# Patient Record
Sex: FEMALE | Race: AMERICAN INDIAN OR ALASKA NATIVE | NOT HISPANIC OR LATINO | Employment: UNEMPLOYED | URBAN - METROPOLITAN AREA
[De-identification: names, ages, dates, MRNs, and addresses within clinical notes are randomized per-mention and may not be internally consistent; named-entity substitution may affect disease eponyms.]

---

## 2018-04-06 ENCOUNTER — HOSPITAL ENCOUNTER (OUTPATIENT)
Dept: RADIOLOGY | Facility: MEDICAL CENTER | Age: 38
End: 2018-04-06
Attending: FAMILY MEDICINE
Payer: MEDICAID

## 2018-04-06 DIAGNOSIS — M25.531 RIGHT WRIST PAIN: ICD-10-CM

## 2018-04-06 PROCEDURE — 73221 MRI JOINT UPR EXTREM W/O DYE: CPT | Mod: RT

## 2018-07-09 PROCEDURE — 99285 EMERGENCY DEPT VISIT HI MDM: CPT

## 2018-07-09 PROCEDURE — 96374 THER/PROPH/DIAG INJ IV PUSH: CPT

## 2018-07-09 ASSESSMENT — PAIN SCALES - GENERAL: PAINLEVEL_OUTOF10: 6

## 2018-07-10 ENCOUNTER — HOSPITAL ENCOUNTER (EMERGENCY)
Facility: MEDICAL CENTER | Age: 38
End: 2018-07-10
Attending: EMERGENCY MEDICINE
Payer: MEDICAID

## 2018-07-10 ENCOUNTER — APPOINTMENT (OUTPATIENT)
Dept: RADIOLOGY | Facility: MEDICAL CENTER | Age: 38
End: 2018-07-10
Attending: EMERGENCY MEDICINE
Payer: MEDICAID

## 2018-07-10 VITALS
BODY MASS INDEX: 38.58 KG/M2 | HEART RATE: 91 BPM | TEMPERATURE: 98 F | HEIGHT: 62 IN | RESPIRATION RATE: 18 BRPM | WEIGHT: 209.66 LBS | DIASTOLIC BLOOD PRESSURE: 68 MMHG | SYSTOLIC BLOOD PRESSURE: 119 MMHG | OXYGEN SATURATION: 93 %

## 2018-07-10 DIAGNOSIS — R10.84 GENERALIZED ABDOMINAL PAIN: ICD-10-CM

## 2018-07-10 LAB
ALBUMIN SERPL BCP-MCNC: 4.1 G/DL (ref 3.2–4.9)
ALBUMIN/GLOB SERPL: 1.1 G/DL
ALP SERPL-CCNC: 92 U/L (ref 30–99)
ALT SERPL-CCNC: 46 U/L (ref 2–50)
ANION GAP SERPL CALC-SCNC: 12 MMOL/L (ref 0–11.9)
AST SERPL-CCNC: 73 U/L (ref 12–45)
BASOPHILS # BLD AUTO: 0.2 % (ref 0–1.8)
BASOPHILS # BLD: 0.04 K/UL (ref 0–0.12)
BILIRUB SERPL-MCNC: 0.9 MG/DL (ref 0.1–1.5)
BUN SERPL-MCNC: 14 MG/DL (ref 8–22)
CALCIUM SERPL-MCNC: 9.5 MG/DL (ref 8.5–10.5)
CHLORIDE SERPL-SCNC: 105 MMOL/L (ref 96–112)
CO2 SERPL-SCNC: 19 MMOL/L (ref 20–33)
CREAT SERPL-MCNC: 0.81 MG/DL (ref 0.5–1.4)
EOSINOPHIL # BLD AUTO: 0.03 K/UL (ref 0–0.51)
EOSINOPHIL NFR BLD: 0.2 % (ref 0–6.9)
ERYTHROCYTE [DISTWIDTH] IN BLOOD BY AUTOMATED COUNT: 43.7 FL (ref 35.9–50)
GLOBULIN SER CALC-MCNC: 3.7 G/DL (ref 1.9–3.5)
GLUCOSE SERPL-MCNC: 138 MG/DL (ref 65–99)
HCG SERPL QL: NEGATIVE
HCT VFR BLD AUTO: 42.3 % (ref 37–47)
HGB BLD-MCNC: 14.7 G/DL (ref 12–16)
IMM GRANULOCYTES # BLD AUTO: 0.06 K/UL (ref 0–0.11)
IMM GRANULOCYTES NFR BLD AUTO: 0.4 % (ref 0–0.9)
LIPASE SERPL-CCNC: 10 U/L (ref 11–82)
LYMPHOCYTES # BLD AUTO: 1.73 K/UL (ref 1–4.8)
LYMPHOCYTES NFR BLD: 10.2 % (ref 22–41)
MCH RBC QN AUTO: 33.2 PG (ref 27–33)
MCHC RBC AUTO-ENTMCNC: 34.8 G/DL (ref 33.6–35)
MCV RBC AUTO: 95.5 FL (ref 81.4–97.8)
MONOCYTES # BLD AUTO: 0.83 K/UL (ref 0–0.85)
MONOCYTES NFR BLD AUTO: 4.9 % (ref 0–13.4)
NEUTROPHILS # BLD AUTO: 14.2 K/UL (ref 2–7.15)
NEUTROPHILS NFR BLD: 84.1 % (ref 44–72)
NRBC # BLD AUTO: 0 K/UL
NRBC BLD-RTO: 0 /100 WBC
PLATELET # BLD AUTO: 263 K/UL (ref 164–446)
PMV BLD AUTO: 9.9 FL (ref 9–12.9)
POTASSIUM SERPL-SCNC: 3.9 MMOL/L (ref 3.6–5.5)
PROT SERPL-MCNC: 7.8 G/DL (ref 6–8.2)
RBC # BLD AUTO: 4.43 M/UL (ref 4.2–5.4)
SODIUM SERPL-SCNC: 136 MMOL/L (ref 135–145)
WBC # BLD AUTO: 16.9 K/UL (ref 4.8–10.8)

## 2018-07-10 PROCEDURE — 76830 TRANSVAGINAL US NON-OB: CPT

## 2018-07-10 PROCEDURE — 80053 COMPREHEN METABOLIC PANEL: CPT

## 2018-07-10 PROCEDURE — 84703 CHORIONIC GONADOTROPIN ASSAY: CPT

## 2018-07-10 PROCEDURE — 85025 COMPLETE CBC W/AUTO DIFF WBC: CPT

## 2018-07-10 PROCEDURE — 700111 HCHG RX REV CODE 636 W/ 250 OVERRIDE (IP): Performed by: EMERGENCY MEDICINE

## 2018-07-10 PROCEDURE — 83690 ASSAY OF LIPASE: CPT

## 2018-07-10 RX ORDER — MORPHINE SULFATE 4 MG/ML
4 INJECTION, SOLUTION INTRAMUSCULAR; INTRAVENOUS ONCE
Status: COMPLETED | OUTPATIENT
Start: 2018-07-10 | End: 2018-07-10

## 2018-07-10 RX ORDER — PROPRANOLOL HYDROCHLORIDE 40 MG/1
40 TABLET ORAL 3 TIMES DAILY
COMMUNITY
End: 2019-11-12

## 2018-07-10 RX ADMIN — MORPHINE SULFATE 4 MG: 4 INJECTION INTRAVENOUS at 01:42

## 2018-07-10 NOTE — ED NOTES
Pt comes up to the desk stating that their pain is getting worse. Pt states that the pain is now at 8/10. Triage RN notified.

## 2018-07-10 NOTE — ED TRIAGE NOTES
"Chief Complaint   Patient presents with   • Abdominal Pain     Pt transfer from [facility].  Pt states they found ovarian cyst on left ovary, and were unable to find right ovary     Pt ambulatory to triage with above complaint.  Pt states she was transferred here because previous facility \"wanted her to be looked at by an OB-GYN to see if they could find her ovaries or determine if a cyst had ruptured.\"  Pt states she was given antibiotics and pain medication at previous facility.      Pt returned to [facility], educated on triage process, and to inform staff of any changes or concerns.    Blood pressure 119/68, pulse (!) 106, temperature 36.7 °C (98 °F), resp. rate 18, height 1.575 m (5' 2\"), weight 95.1 kg (209 lb 10.5 oz), SpO2 94 %.      "

## 2018-07-10 NOTE — DISCHARGE INSTRUCTIONS
You were seen in the ER for abdominal pain. An ultrasound was able to see your right ovary and there was noted to be fluid in your pelvis to suggest a ruptured hemorrhagic cyst. I have spoken with our on-call gynecologist who feels that you are safe for discharge. Tylenol and ibuprofen for pain control. Follow up with her primary care physician within the next 24-48 hours. Return to the ER with new or worsening symptoms.    Abdominal Pain (Nonspecific)  Your exam might not show the exact reason you have abdominal pain. Since there are many different causes of abdominal pain, another checkup and more tests may be needed. It is very important to follow up for lasting (persistent) or worsening symptoms. A possible cause of abdominal pain in any person who still has his or her appendix is acute appendicitis. Appendicitis is often hard to diagnose. Normal blood tests, urine tests, ultrasound, and CT scans do not completely rule out early appendicitis or other causes of abdominal pain. Sometimes, only the changes that happen over time will allow appendicitis and other causes of abdominal pain to be determined. Other potential problems that may require surgery may also take time to become more apparent. Because of this, it is important that you follow all of the instructions below.  HOME CARE INSTRUCTIONS   · Rest as much as possible.   · Do not eat solid food until your pain is gone.   · While adults or children have pain: A diet of water, weak decaffeinated tea, broth or bouillon, gelatin, oral rehydration solutions (ORS), frozen ice pops, or ice chips may be helpful.   · When pain is gone in adults or children: Start a light diet (dry toast, crackers, applesauce, or white rice). Increase the diet slowly as long as it does not bother you. Eat no dairy products (including cheese and eggs) and no spicy, fatty, fried, or high-fiber foods.   · Use no alcohol, caffeine, or cigarettes.   · Take your regular medicines unless  your caregiver told you not to.   · Take any prescribed medicine as directed.   · Only take over-the-counter or prescription medicines for pain, discomfort, or fever as directed by your caregiver. Do not give aspirin to children.   If your caregiver has given you a follow-up appointment, it is very important to keep that appointment. Not keeping the appointment could result in a permanent injury and/or lasting (chronic) pain and/or disability. If there is any problem keeping the appointment, you must call to reschedule.   SEEK IMMEDIATE MEDICAL CARE IF:   · Your pain is not gone in 24 hours.   · Your pain becomes worse, changes location, or feels different.   · You or your child has an oral temperature above 102° F (38.9° C), not controlled by medicine.   · Your baby is older than 3 months with a rectal temperature of 102° F (38.9° C) or higher.   · Your baby is 3 months old or younger with a rectal temperature of 100.4° F (38° C) or higher.   · You have shaking chills.   · You keep throwing up (vomiting) or cannot drink liquids.   · There is blood in your vomit or you see blood in your bowel movements.   · Your bowel movements become dark or black.   · You have frequent bowel movements.   · Your bowel movements stop (become blocked) or you cannot pass gas.   · You have bloody, frequent, or painful urination.   · You have yellow discoloration in the skin or whites of the eyes.   · Your stomach becomes bloated or bigger.   · You have dizziness or fainting.   · You have chest or back pain.   MAKE SURE YOU:   · Understand these instructions.   · Will watch your condition.   · Will get help right away if you are not doing well or get worse.   Document Released: 12/18/2006 Document Revised: 03/11/2013 Document Reviewed: 11/15/2010  [employer]® Patient Information ©2013 [employer].

## 2018-07-10 NOTE — ED PROVIDER NOTES
ED Provider Note    CHIEF COMPLAINT  Chief Complaint   Patient presents with   • Abdominal Pain     Pt transfer from [facility].  Pt states they found ovarian cyst on left ovary, and were unable to find right ovary       HPI  Adry Sanchez is a 38 y.o. female who presents with a chief complaint of abdominal pain. Patient reports that she developed bilateral lower quadrant and suprapubic pain earlier today that is sharp and cramping in nature. The pain doubled her over and she took ibuprofen prior to evaluation in the [facility] emergency department. The patient reports that urinating made her pain significantly worse. No nausea or vomiting, hematuria, diarrhea, fever, chest pain, shortness of breath. Initially seen in the [facility] ED where labs and imaging (TVUS and CT abdomen/pelvis) were unremarkable. Transferred to [facility] ED for potential gynecology consult.    REVIEW OF SYSTEMS  See HPI for further details. Abdominal pain. All other systems are negative.     PAST MEDICAL HISTORY   has a past medical history of Arthritis; Depression; Fracture; Head ache; Heart murmur; Hypertension; and Migraines.    SOCIAL HISTORY  Social History     Social History Main Topics   • Smoking status: Current Some Day Smoker     Packs/day: 0.25     Years: 16.00     Types: Cigarettes   • Smokeless tobacco: Never Used      Comment: occasionally x 14 years   • Alcohol use Yes      Comment: occassional   • Drug use: No   • Sexual activity: Not on file       SURGICAL HISTORY   has a past surgical history that includes appendectomy.    CURRENT MEDICATIONS  Home Medications     Reviewed by Soledad Lewis R.N. (Registered Nurse) on 07/10/18 at 0036  Med List Status: Complete   Medication Last Dose Status   DULoxetine HCl (CYMBALTA PO) not taking Active   hydrocodone-acetaminophen (VICODIN) 5-500 MG TABS not taking Active   MedroxyPROGESTERone Acetate (DEPO-PROVERA IM) not taking Active   methocarbamol (ROBAXIN) 750  "MG TABS 7/10/2018 Active   Non Formulary Request not taking Active   oxycodone-acetaminophen (PERCOCET) 5-325 MG TABS not  taking Active   oxycodone-acetaminophen (PERCOCET) 5-325 MG TABS not taking Active   oxycodone-acetaminophen (PERCOCET) 5-325 MG TABS not taking Active   propranolol (INDERAL) 40 MG Tab 7/10/2018 Active   sumatriptan (IMITREX) 50 MG Tab not taking Active   trazodone (DESYREL) 50 MG Tab 7/9/2018 Active                ALLERGIES  No Known Allergies    PHYSICAL EXAM  VITAL SIGNS: /68   Pulse 90   Temp 36.7 °C (98 °F)   Resp 18   Ht 1.575 m (5' 2\")   Wt 95.1 kg (209 lb 10.5 oz)   SpO2 96%   BMI 38.35 kg/m²    Pulse ox interpretation: I interpret this pulse ox as normal.  Constitutional: Alert in no apparent distress.  HENT: No signs of trauma, Bilateral external ears normal, Nose normal.   Eyes: Pupils are equal and reactive, Conjunctiva normal, Non-icteric.   Neck: Normal range of motion, No tenderness, Supple, No stridor.   Lymphatic: No lymphadenopathy noted.   Cardiovascular: Regular rate and rhythm, no murmurs.   Thorax & Lungs: Normal breath sounds, No respiratory distress, No wheezing, No chest tenderness.   Abdomen: Bowel sounds normal, Soft, diffuse abdominal tenderness to palpation, No masses, No pulsatile masses. No peritoneal signs.  : Deferred given patient's exam earlier tonight  Skin: Warm, Dry, No erythema, No rash.   Back: No bony tenderness, No CVA tenderness.   Extremities: Intact distal pulses, No edema, No tenderness, No cyanosis.  Musculoskeletal: Good range of motion in all major joints. No tenderness to palpation or major deformities noted.   Neurologic: Alert , Normal motor function, Normal sensory function, No focal deficits noted.   Psychiatric: Affect normal, Judgment normal, Mood normal.       DIAGNOSTIC STUDIES / PROCEDURES      LABS  CBC  CMP  Lipase  HCG    RADIOLOGY  Transvaginal ultrasound      COURSE & MEDICAL DECISION MAKING  Pertinent Labs & Imaging " studies reviewed. (See chart for details)  This is a 38-year-old female here with diffuse abdominal pain. Patient was seen at the [facility] emergency department earlier tonight and per chart review, she had an ultrasound performed that noted a 5.5 cm cystic structure on the left ovary but her right ovary was not visualized. A CT of the abdomen/pelvis also revealed a 5.5 cm cystic process in the left ovary likely a cyst. The appendix was not visualized but no obvious appendicitis was appreciated. Her IUD was noted on the CT scan to be in the uterus in good position. During that ER visit, her vital signs were normal beyond an elevated blood pressure. A pelvic exam was performed that revealed whitish yellow discharge in the vaginal vault with cervical motion tenderness and right adnexal tenderness. She did not have a leukocytosis and she had a mild transaminitis. A wet prep did not reveal trichomonads, clue cells, or yeast. Chlamydia and gonorrhea PCR were negative. A urine pregnancy test was negative as well. Urinalysis did not suggest infection. She was given a dose of azithromycin and ceftriaxone for presumed cervicitis/PID as well as morphine and oxycodone for pain control. She was transferred to this ER for potential gynecology evaluation given the ultrasound's inability to visualize her right ovary.    Patient arrives tachycardic but afebrile with otherwise normal vital signs. She appears well-hydrated and nontoxic. Her abdominal exam reveals tenderness to palpation diffusely now worse in the bilateral upper quadrants. Pelvic exam deferred given patient's recent exam performed at the outside facility. She does have a negative wet prep and GC/chlamydia. Plan for pain control and will repeat the transvaginal ultrasound with specific instructions to evaluate the right ovary. Will also redraw a CBC and CMP to trend LFTs and ensure she is not worsening from that standpoint suggesting possible  cholecystitis.    Labs reveal leukocytosis; worsening from previous lab draw. HCG remains negative. Transaminitis is resolving. Doubt cholecystitis, choledocolithiasis.    TVUS reveals moderate to large quantity of free fluid within the pelvis c/w hemorrhagic fluid. Right ovary visualized with good flow. Unlikely torsion. Large left ovarian cyst c/w hemorrhagic cyst.    Discussed with on-call gynecologist; Dr. Zimmerman who feels the patient is safe for discharge from a gynecologic standpoint. Recommendation for pain control.    Reevaluated the patient. Abdomen continues to be tender more on the right side now. Given the worsening leukocytosis, I recommended admission to the hospital for continued pain management, serial abdominal exams, repeat labs to ensure resolution of leukocytosis. Patient declines admission. Her vital signs are normal and stable. I do feel it is reasonable to let her go home with very strict return precautions and very close outpatient follow up. She agrees to this. She has a PCP and will call them for a follow up appointment within 24 hours. She and her  promise to return to the ER with any new or worsening symptoms. She will also need gyn follow up for her ovarian cysts. Tylenol and ibuprofen for pain control.    The patient will return for worsening symptoms and is stable at the time of discharge. The patient verbalizes understanding and will comply.    FINAL IMPRESSION  1. Abdominal pain  2. Left ovarian cyst      Electronically signed by: Jose Alejandro Dickerson, 7/10/2018 1:11 AM

## 2019-11-12 DIAGNOSIS — Z01.810 PRE-OPERATIVE CARDIOVASCULAR EXAMINATION: ICD-10-CM

## 2019-11-12 DIAGNOSIS — Z01.812 PRE-OPERATIVE LABORATORY EXAMINATION: ICD-10-CM

## 2019-11-12 LAB
ANION GAP SERPL CALC-SCNC: 10 MMOL/L (ref 0–11.9)
APPEARANCE UR: CLEAR
BASOPHILS # BLD AUTO: 0.7 % (ref 0–1.8)
BASOPHILS # BLD: 0.06 K/UL (ref 0–0.12)
BILIRUB UR QL STRIP.AUTO: NEGATIVE
BUN SERPL-MCNC: 8 MG/DL (ref 8–22)
CALCIUM SERPL-MCNC: 9.7 MG/DL (ref 8.5–10.5)
CHLORIDE SERPL-SCNC: 101 MMOL/L (ref 96–112)
CO2 SERPL-SCNC: 25 MMOL/L (ref 20–33)
COLOR UR: YELLOW
CREAT SERPL-MCNC: 0.75 MG/DL (ref 0.5–1.4)
EOSINOPHIL # BLD AUTO: 0.12 K/UL (ref 0–0.51)
EOSINOPHIL NFR BLD: 1.5 % (ref 0–6.9)
ERYTHROCYTE [DISTWIDTH] IN BLOOD BY AUTOMATED COUNT: 43 FL (ref 35.9–50)
GLUCOSE SERPL-MCNC: 87 MG/DL (ref 65–99)
GLUCOSE UR STRIP.AUTO-MCNC: NEGATIVE MG/DL
HCT VFR BLD AUTO: 45.5 % (ref 37–47)
HGB BLD-MCNC: 15.1 G/DL (ref 12–16)
IMM GRANULOCYTES # BLD AUTO: 0.05 K/UL (ref 0–0.11)
IMM GRANULOCYTES NFR BLD AUTO: 0.6 % (ref 0–0.9)
KETONES UR STRIP.AUTO-MCNC: NEGATIVE MG/DL
LEUKOCYTE ESTERASE UR QL STRIP.AUTO: NEGATIVE
LYMPHOCYTES # BLD AUTO: 2.28 K/UL (ref 1–4.8)
LYMPHOCYTES NFR BLD: 27.8 % (ref 22–41)
MCH RBC QN AUTO: 32.6 PG (ref 27–33)
MCHC RBC AUTO-ENTMCNC: 33.2 G/DL (ref 33.6–35)
MCV RBC AUTO: 98.3 FL (ref 81.4–97.8)
MICRO URNS: NORMAL
MONOCYTES # BLD AUTO: 0.64 K/UL (ref 0–0.85)
MONOCYTES NFR BLD AUTO: 7.8 % (ref 0–13.4)
NEUTROPHILS # BLD AUTO: 5.04 K/UL (ref 2–7.15)
NEUTROPHILS NFR BLD: 61.6 % (ref 44–72)
NITRITE UR QL STRIP.AUTO: NEGATIVE
NRBC # BLD AUTO: 0 K/UL
NRBC BLD-RTO: 0 /100 WBC
PH UR STRIP.AUTO: 7 [PH] (ref 5–8)
PLATELET # BLD AUTO: 349 K/UL (ref 164–446)
PMV BLD AUTO: 9.4 FL (ref 9–12.9)
POTASSIUM SERPL-SCNC: 4.5 MMOL/L (ref 3.6–5.5)
PROT UR QL STRIP: NEGATIVE MG/DL
RBC # BLD AUTO: 4.63 M/UL (ref 4.2–5.4)
RBC UR QL AUTO: NEGATIVE
SODIUM SERPL-SCNC: 136 MMOL/L (ref 135–145)
SP GR UR STRIP.AUTO: 1.01
UROBILINOGEN UR STRIP.AUTO-MCNC: 0.2 MG/DL
WBC # BLD AUTO: 8.2 K/UL (ref 4.8–10.8)

## 2019-11-12 PROCEDURE — 93005 ELECTROCARDIOGRAM TRACING: CPT

## 2019-11-12 PROCEDURE — 93010 ELECTROCARDIOGRAM REPORT: CPT | Performed by: INTERNAL MEDICINE

## 2019-11-12 PROCEDURE — 81003 URINALYSIS AUTO W/O SCOPE: CPT

## 2019-11-12 PROCEDURE — 80048 BASIC METABOLIC PNL TOTAL CA: CPT

## 2019-11-12 PROCEDURE — 36415 COLL VENOUS BLD VENIPUNCTURE: CPT

## 2019-11-12 PROCEDURE — 85025 COMPLETE CBC W/AUTO DIFF WBC: CPT

## 2019-11-12 RX ORDER — PROPRANOLOL HYDROCHLORIDE 20 MG/1
20 TABLET ORAL 2 TIMES DAILY
COMMUNITY

## 2019-11-13 LAB — EKG IMPRESSION: NORMAL

## 2019-11-20 NOTE — H&P
DATE OF SCHEDULED SURGERY:  2019    HISTORY OF PRESENT ILLNESS:  The patient is a 39-year-old  6, para   4-0-2-4, with 4 normal vaginal deliveries, desires sterilization, also has a   finding of left ovarian cyst during evaluation for pelvic pain and wants to   get the cyst out and possible the ovary if it is abnormal.  The patient is   here for preop appointment.  The patient currently has a Mirena IUD for   contraception and has not bled for 8 years, but desires sterilization and is   going to remove the IUD and also take care of her left ovarian cyst.    MEDICAL HISTORY:  The patient denies high blood pressure, diabetes and thyroid   issues, is not on any medication currently.    OBSTETRIC HISTORY:   7, para 4, 4 normal vaginal deliveries.    SOCIAL HISTORY:  Current someday smoker, occasional use of alcohol.    SURGICAL HISTORY:  Had an appendectomy before.    FAMILY HISTORY:  Nothing contributory.    ALLERGIES:  The patient denies any known drug allergy.    REVIEW OF SYSTEMS:  The patient is alert and oriented.  Regular bowel and   bladder habits.  Denies chest pain, palpitation, or shortness of breath.    PHYSICAL EXAMINATION:  VITAL SIGNS:  The patient is 5 feet 2 inches tall and weighs 200 pounds.    Vital signs are stable.  See EMR for current vitals.  GENERAL:  The patient is awake, alert, well developed, well nourished and well   groomed.  RESPIRATION:  The patient is relaxed and breathes without effort.  LUNGS:  Clear to auscultate bilaterally.  No crackles, wheezes, or rhonchi.  HEART:  Normal, rhythm is regular.  S1, S2 are normal.  No murmurs.  No   gallops or rubs.  ABDOMEN:  Soft, nontender.  No guarding or rigidity.  No palpable masses, no   hepatosplenomegaly, no costovertebral angle tenderness.  PELVIC:  Deferred.    IMPRESSION:  A 39-year-old  6, para 4-0-2-4, with 4 normal vaginal   deliveries with a Mirena intrauterine device in place, desires removal of    intrauterine device and sterilization and opted for bilateral salpingectomy.    Scheduled for laparoscopic bilateral salpingectomy and ovarian cystectomy,   possible unilateral salpingo-oophorectomy, possible laparotomy, and   intrauterine device removal.  Preoperative instructions were given.  Operative   procedure was discussed in detail.  Postoperative recovery was explained and   all questions answered to satisfaction.  Risk inclusive of but not limited to   anesthesia, infection, injury was explained.  The patient understands and   wants to go ahead with the procedure.             ____________________________________     MD VAL Cuenca / MELISSA    DD:  11/20/2019 11:55:41  DT:  11/20/2019 13:05:32    D#:  [id number]  Job#:  [id number]

## 2019-12-03 ENCOUNTER — ANESTHESIA (OUTPATIENT)
Dept: SURGERY | Facility: MEDICAL CENTER | Age: 39
End: 2019-12-03
Payer: MEDICAID

## 2019-12-03 ENCOUNTER — ANESTHESIA EVENT (OUTPATIENT)
Dept: SURGERY | Facility: MEDICAL CENTER | Age: 39
End: 2019-12-03
Payer: MEDICAID

## 2019-12-03 ENCOUNTER — HOSPITAL ENCOUNTER (OUTPATIENT)
Facility: MEDICAL CENTER | Age: 39
End: 2019-12-03
Attending: OBSTETRICS & GYNECOLOGY | Admitting: OBSTETRICS & GYNECOLOGY
Payer: MEDICAID

## 2019-12-03 VITALS
OXYGEN SATURATION: 94 % | SYSTOLIC BLOOD PRESSURE: 126 MMHG | RESPIRATION RATE: 16 BRPM | HEIGHT: 62 IN | DIASTOLIC BLOOD PRESSURE: 65 MMHG | WEIGHT: 201.06 LBS | TEMPERATURE: 97.9 F | BODY MASS INDEX: 37 KG/M2 | HEART RATE: 68 BPM

## 2019-12-03 DIAGNOSIS — G89.18 POST-OP PAIN: ICD-10-CM

## 2019-12-03 LAB
B-HCG FREE SERPL-ACNC: <5 MIU/ML
IHCGL IHCGL: NEGATIVE MIU/ML
PATHOLOGY CONSULT NOTE: NORMAL

## 2019-12-03 PROCEDURE — 700101 HCHG RX REV CODE 250: Performed by: ANESTHESIOLOGY

## 2019-12-03 PROCEDURE — 700111 HCHG RX REV CODE 636 W/ 250 OVERRIDE (IP): Performed by: ANESTHESIOLOGY

## 2019-12-03 PROCEDURE — 160002 HCHG RECOVERY MINUTES (STAT): Performed by: OBSTETRICS & GYNECOLOGY

## 2019-12-03 PROCEDURE — 700101 HCHG RX REV CODE 250: Performed by: OBSTETRICS & GYNECOLOGY

## 2019-12-03 PROCEDURE — 88302 TISSUE EXAM BY PATHOLOGIST: CPT | Mod: 59

## 2019-12-03 PROCEDURE — 160046 HCHG PACU - 1ST 60 MINS PHASE II: Performed by: OBSTETRICS & GYNECOLOGY

## 2019-12-03 PROCEDURE — 160029 HCHG SURGERY MINUTES - 1ST 30 MINS LEVEL 4: Performed by: OBSTETRICS & GYNECOLOGY

## 2019-12-03 PROCEDURE — 160036 HCHG PACU - EA ADDL 30 MINS PHASE I: Performed by: OBSTETRICS & GYNECOLOGY

## 2019-12-03 PROCEDURE — A6402 STERILE GAUZE <= 16 SQ IN: HCPCS | Performed by: OBSTETRICS & GYNECOLOGY

## 2019-12-03 PROCEDURE — 501582 HCHG TROCAR, THRD BLADED: Performed by: OBSTETRICS & GYNECOLOGY

## 2019-12-03 PROCEDURE — 500002 HCHG ADHESIVE, DERMABOND: Performed by: OBSTETRICS & GYNECOLOGY

## 2019-12-03 PROCEDURE — 500868 HCHG NEEDLE, SURGI(VARES): Performed by: OBSTETRICS & GYNECOLOGY

## 2019-12-03 PROCEDURE — 500886 HCHG PACK, LAPAROSCOPY: Performed by: OBSTETRICS & GYNECOLOGY

## 2019-12-03 PROCEDURE — 160041 HCHG SURGERY MINUTES - EA ADDL 1 MIN LEVEL 4: Performed by: OBSTETRICS & GYNECOLOGY

## 2019-12-03 PROCEDURE — 700105 HCHG RX REV CODE 258: Performed by: OBSTETRICS & GYNECOLOGY

## 2019-12-03 PROCEDURE — 160035 HCHG PACU - 1ST 60 MINS PHASE I: Performed by: OBSTETRICS & GYNECOLOGY

## 2019-12-03 PROCEDURE — A9270 NON-COVERED ITEM OR SERVICE: HCPCS | Performed by: ANESTHESIOLOGY

## 2019-12-03 PROCEDURE — A4314 CATH W/DRAINAGE 2-WAY LATEX: HCPCS | Performed by: OBSTETRICS & GYNECOLOGY

## 2019-12-03 PROCEDURE — 501838 HCHG SUTURE GENERAL: Performed by: OBSTETRICS & GYNECOLOGY

## 2019-12-03 PROCEDURE — 160048 HCHG OR STATISTICAL LEVEL 1-5: Performed by: OBSTETRICS & GYNECOLOGY

## 2019-12-03 PROCEDURE — 160009 HCHG ANES TIME/MIN: Performed by: OBSTETRICS & GYNECOLOGY

## 2019-12-03 PROCEDURE — 160025 RECOVERY II MINUTES (STATS): Performed by: OBSTETRICS & GYNECOLOGY

## 2019-12-03 PROCEDURE — 84702 CHORIONIC GONADOTROPIN TEST: CPT

## 2019-12-03 PROCEDURE — 700102 HCHG RX REV CODE 250 W/ 637 OVERRIDE(OP): Performed by: ANESTHESIOLOGY

## 2019-12-03 PROCEDURE — A6404 STERILE GAUZE > 48 SQ IN: HCPCS | Performed by: OBSTETRICS & GYNECOLOGY

## 2019-12-03 PROCEDURE — 88307 TISSUE EXAM BY PATHOLOGIST: CPT

## 2019-12-03 RX ORDER — DEXAMETHASONE SODIUM PHOSPHATE 4 MG/ML
INJECTION, SOLUTION INTRA-ARTICULAR; INTRALESIONAL; INTRAMUSCULAR; INTRAVENOUS; SOFT TISSUE PRN
Status: DISCONTINUED | OUTPATIENT
Start: 2019-12-03 | End: 2019-12-03 | Stop reason: SURG

## 2019-12-03 RX ORDER — ONDANSETRON 2 MG/ML
INJECTION INTRAMUSCULAR; INTRAVENOUS PRN
Status: DISCONTINUED | OUTPATIENT
Start: 2019-12-03 | End: 2019-12-03 | Stop reason: SURG

## 2019-12-03 RX ORDER — BUPIVACAINE HYDROCHLORIDE AND EPINEPHRINE 2.5; 5 MG/ML; UG/ML
INJECTION, SOLUTION EPIDURAL; INFILTRATION; INTRACAUDAL; PERINEURAL
Status: DISCONTINUED
Start: 2019-12-03 | End: 2019-12-03 | Stop reason: HOSPADM

## 2019-12-03 RX ORDER — HYDRALAZINE HYDROCHLORIDE 20 MG/ML
5 INJECTION INTRAMUSCULAR; INTRAVENOUS
Status: DISCONTINUED | OUTPATIENT
Start: 2019-12-03 | End: 2019-12-03 | Stop reason: HOSPADM

## 2019-12-03 RX ORDER — CEFAZOLIN SODIUM 1 G/3ML
INJECTION, POWDER, FOR SOLUTION INTRAMUSCULAR; INTRAVENOUS PRN
Status: DISCONTINUED | OUTPATIENT
Start: 2019-12-03 | End: 2019-12-03 | Stop reason: SURG

## 2019-12-03 RX ORDER — GABAPENTIN 300 MG/1
300 CAPSULE ORAL ONCE
Status: COMPLETED | OUTPATIENT
Start: 2019-12-03 | End: 2019-12-03

## 2019-12-03 RX ORDER — OXYCODONE HCL 5 MG/5 ML
10 SOLUTION, ORAL ORAL
Status: COMPLETED | OUTPATIENT
Start: 2019-12-03 | End: 2019-12-03

## 2019-12-03 RX ORDER — HYDROMORPHONE HYDROCHLORIDE 1 MG/ML
0.2 INJECTION, SOLUTION INTRAMUSCULAR; INTRAVENOUS; SUBCUTANEOUS
Status: DISCONTINUED | OUTPATIENT
Start: 2019-12-03 | End: 2019-12-03 | Stop reason: HOSPADM

## 2019-12-03 RX ORDER — IBUPROFEN 800 MG/1
800 TABLET ORAL EVERY 8 HOURS PRN
Qty: 30 TAB | Refills: 1 | Status: SHIPPED | OUTPATIENT
Start: 2019-12-03

## 2019-12-03 RX ORDER — LIDOCAINE HYDROCHLORIDE 20 MG/ML
INJECTION, SOLUTION EPIDURAL; INFILTRATION; INTRACAUDAL; PERINEURAL PRN
Status: DISCONTINUED | OUTPATIENT
Start: 2019-12-03 | End: 2019-12-03 | Stop reason: SURG

## 2019-12-03 RX ORDER — ONDANSETRON 2 MG/ML
4 INJECTION INTRAMUSCULAR; INTRAVENOUS
Status: DISCONTINUED | OUTPATIENT
Start: 2019-12-03 | End: 2019-12-03 | Stop reason: HOSPADM

## 2019-12-03 RX ORDER — HYDROMORPHONE HYDROCHLORIDE 1 MG/ML
0.1 INJECTION, SOLUTION INTRAMUSCULAR; INTRAVENOUS; SUBCUTANEOUS
Status: DISCONTINUED | OUTPATIENT
Start: 2019-12-03 | End: 2019-12-03 | Stop reason: HOSPADM

## 2019-12-03 RX ORDER — SODIUM CHLORIDE, SODIUM LACTATE, POTASSIUM CHLORIDE, CALCIUM CHLORIDE 600; 310; 30; 20 MG/100ML; MG/100ML; MG/100ML; MG/100ML
INJECTION, SOLUTION INTRAVENOUS CONTINUOUS
Status: DISCONTINUED | OUTPATIENT
Start: 2019-12-03 | End: 2019-12-03 | Stop reason: HOSPADM

## 2019-12-03 RX ORDER — OXYCODONE HCL 5 MG/5 ML
5 SOLUTION, ORAL ORAL
Status: COMPLETED | OUTPATIENT
Start: 2019-12-03 | End: 2019-12-03

## 2019-12-03 RX ORDER — CELECOXIB 200 MG/1
400 CAPSULE ORAL ONCE
Status: COMPLETED | OUTPATIENT
Start: 2019-12-03 | End: 2019-12-03

## 2019-12-03 RX ORDER — OXYCODONE HYDROCHLORIDE AND ACETAMINOPHEN 5; 325 MG/1; MG/1
1-2 TABLET ORAL EVERY 4 HOURS PRN
Qty: 15 TAB | Refills: 0 | Status: SHIPPED | OUTPATIENT
Start: 2019-12-03 | End: 2019-12-09

## 2019-12-03 RX ORDER — MEPERIDINE HYDROCHLORIDE 25 MG/ML
6.25 INJECTION INTRAMUSCULAR; INTRAVENOUS; SUBCUTANEOUS
Status: DISCONTINUED | OUTPATIENT
Start: 2019-12-03 | End: 2019-12-03 | Stop reason: HOSPADM

## 2019-12-03 RX ORDER — HYDROMORPHONE HYDROCHLORIDE 1 MG/ML
0.4 INJECTION, SOLUTION INTRAMUSCULAR; INTRAVENOUS; SUBCUTANEOUS
Status: DISCONTINUED | OUTPATIENT
Start: 2019-12-03 | End: 2019-12-03 | Stop reason: HOSPADM

## 2019-12-03 RX ORDER — MAGNESIUM SULFATE HEPTAHYDRATE 500 MG/ML
INJECTION, SOLUTION INTRAMUSCULAR; INTRAVENOUS PRN
Status: DISCONTINUED | OUTPATIENT
Start: 2019-12-03 | End: 2019-12-03 | Stop reason: SURG

## 2019-12-03 RX ORDER — IBUPROFEN 800 MG/1
800 TABLET ORAL EVERY 8 HOURS PRN
Qty: 30 TAB | Refills: 1 | Status: SHIPPED | OUTPATIENT
Start: 2019-12-03 | End: 2019-12-03 | Stop reason: SDUPTHER

## 2019-12-03 RX ORDER — SCOLOPAMINE TRANSDERMAL SYSTEM 1 MG/1
1 PATCH, EXTENDED RELEASE TRANSDERMAL ONCE
Status: DISCONTINUED | OUTPATIENT
Start: 2019-12-03 | End: 2019-12-03 | Stop reason: HOSPADM

## 2019-12-03 RX ORDER — ROCURONIUM BROMIDE 10 MG/ML
INJECTION, SOLUTION INTRAVENOUS PRN
Status: DISCONTINUED | OUTPATIENT
Start: 2019-12-03 | End: 2019-12-03 | Stop reason: SURG

## 2019-12-03 RX ORDER — ACETAMINOPHEN 500 MG
1000 TABLET ORAL ONCE
Status: COMPLETED | OUTPATIENT
Start: 2019-12-03 | End: 2019-12-03

## 2019-12-03 RX ORDER — DIPHENHYDRAMINE HYDROCHLORIDE 50 MG/ML
12.5 INJECTION INTRAMUSCULAR; INTRAVENOUS
Status: DISCONTINUED | OUTPATIENT
Start: 2019-12-03 | End: 2019-12-03 | Stop reason: HOSPADM

## 2019-12-03 RX ORDER — BUPIVACAINE HYDROCHLORIDE AND EPINEPHRINE 2.5; 5 MG/ML; UG/ML
INJECTION, SOLUTION EPIDURAL; INFILTRATION; INTRACAUDAL; PERINEURAL
Status: DISCONTINUED | OUTPATIENT
Start: 2019-12-03 | End: 2019-12-03 | Stop reason: HOSPADM

## 2019-12-03 RX ORDER — HALOPERIDOL 5 MG/ML
1 INJECTION INTRAMUSCULAR
Status: DISCONTINUED | OUTPATIENT
Start: 2019-12-03 | End: 2019-12-03 | Stop reason: HOSPADM

## 2019-12-03 RX ADMIN — CEFAZOLIN 2 G: 330 INJECTION, POWDER, FOR SOLUTION INTRAMUSCULAR; INTRAVENOUS at 11:03

## 2019-12-03 RX ADMIN — ACETAMINOPHEN 1000 MG: 500 TABLET ORAL at 09:19

## 2019-12-03 RX ADMIN — FENTANYL CITRATE 100 MCG: 50 INJECTION, SOLUTION INTRAMUSCULAR; INTRAVENOUS at 10:50

## 2019-12-03 RX ADMIN — MAGNESIUM SULFATE HEPTAHYDRATE 2 G: 500 INJECTION, SOLUTION INTRAMUSCULAR; INTRAVENOUS at 10:56

## 2019-12-03 RX ADMIN — FENTANYL CITRATE 50 MCG: 0.05 INJECTION, SOLUTION INTRAMUSCULAR; INTRAVENOUS at 12:25

## 2019-12-03 RX ADMIN — FENTANYL CITRATE 50 MCG: 0.05 INJECTION, SOLUTION INTRAMUSCULAR; INTRAVENOUS at 12:46

## 2019-12-03 RX ADMIN — ONDANSETRON 4 MG: 2 INJECTION INTRAMUSCULAR; INTRAVENOUS at 11:57

## 2019-12-03 RX ADMIN — PROPOFOL 200 MG: 10 INJECTION, EMULSION INTRAVENOUS at 10:53

## 2019-12-03 RX ADMIN — OXYCODONE HYDROCHLORIDE 10 MG: 5 SOLUTION ORAL at 13:10

## 2019-12-03 RX ADMIN — DEXAMETHASONE SODIUM PHOSPHATE 8 MG: 4 INJECTION, SOLUTION INTRA-ARTICULAR; INTRALESIONAL; INTRAMUSCULAR; INTRAVENOUS; SOFT TISSUE at 10:56

## 2019-12-03 RX ADMIN — FENTANYL CITRATE 100 MCG: 50 INJECTION, SOLUTION INTRAMUSCULAR; INTRAVENOUS at 12:06

## 2019-12-03 RX ADMIN — SODIUM CHLORIDE, POTASSIUM CHLORIDE, SODIUM LACTATE AND CALCIUM CHLORIDE: 600; 310; 30; 20 INJECTION, SOLUTION INTRAVENOUS at 09:22

## 2019-12-03 RX ADMIN — GABAPENTIN 300 MG: 300 CAPSULE ORAL at 09:19

## 2019-12-03 RX ADMIN — CELECOXIB 400 MG: 200 CAPSULE ORAL at 09:19

## 2019-12-03 RX ADMIN — SCOPALAMINE 1 PATCH: 1 PATCH, EXTENDED RELEASE TRANSDERMAL at 09:17

## 2019-12-03 RX ADMIN — LIDOCAINE HYDROCHLORIDE 100 MG: 20 INJECTION, SOLUTION EPIDURAL; INFILTRATION; INTRACAUDAL at 10:52

## 2019-12-03 RX ADMIN — ROCURONIUM BROMIDE 100 MG: 10 INJECTION, SOLUTION INTRAVENOUS at 10:53

## 2019-12-03 ASSESSMENT — PAIN SCALES - GENERAL: PAIN_LEVEL: 5

## 2019-12-03 NOTE — OR SURGEON
Immediate Post OP Note    PreOp Diagnosis: multigravid.   desires sterilisation   let ovarian cysts   chronic pelvic pain.    PostOp Diagnosis:   Same   endometriosis   pelvic and abdominal adhesions.    Procedure(s):  PELVISCOPY - Wound Class: Clean Contaminated  SALPINGECTOMY - Wound Class: Clean Contaminated  OOPHORECTOMY - Wound Class: Clean Contaminated    Surgeon(s):  TRACIE Cuenca M.D.    Anesthesiologist/Type of Anesthesia:  Anesthesiologist: Keysha Alexis M.D./General    Surgical Staff:  Circulator: Mechelle Park R.N.  Relief Circulator: Liliya Almanza R.N.  Scrub Person: Lewis Grierra    Specimens removed if any:  ID Type Source Tests Collected by Time Destination   A : Right Fallopian Tube Tissue Fallopian Tube PATHOLOGY SPECIMEN Shirlene Amanda M.D. 12/3/2019 1144    B : Left Fallopian Tube Tissue Fallopian Tube PATHOLOGY SPECIMEN Shirlene Amanda M.D. 12/3/2019 1145    C : Left Ovary Tissue Ovary PATHOLOGY SPECIMEN Shirlene Amanda M.D. 12/3/2019 1147        Estimated Blood Loss: minimal.    Findings:  Right ovariay had adhesions between ovary and tubes with endometrioistic spots.      left ovary had simple cyst ,   left tube and mesosalpinx had multiple clear vesicular lesions.   cul de sac distorted.    Complications: none.        12/3/2019 12:21 PM Shirlene Amanda M.D.

## 2019-12-03 NOTE — ANESTHESIA POSTPROCEDURE EVALUATION
Patient: Adry Sanchez    Procedure Summary     Date:  12/03/19 Room / Location:  [facility] ROOM 24 / SURGERY SAME DAY [facility]    Anesthesia Start:  1046 Anesthesia Stop:  1211    Procedures:       PELVISCOPY (Abdomen)      SALPINGECTOMY (Bilateral Abdomen)      OOPHORECTOMY (Left Abdomen) Diagnosis:  (ENCOUNTER FOR STERILIZATION, LEFT OVARIAN CYST)    Surgeon:  Shirlene Amanda M.D. Responsible Provider:  Keysha Alexis M.D.    Anesthesia Type:  general ASA Status:  2          Final Anesthesia Type: general  Last vitals  BP   Blood Pressure: (!) 92/48    Temp   36.6 °C (97.9 °F)    Pulse   Pulse: 67   Resp   16    SpO2   97 %      Anesthesia Post Evaluation    Patient location during evaluation: PACU  Patient participation: complete - patient participated  Level of consciousness: awake and alert  Pain score: 5    Airway patency: patent  Anesthetic complications: no  Cardiovascular status: hemodynamically stable  Respiratory status: acceptable  Hydration status: euvolemic    PONV: none           Nurse Pain Score: 5 (NPRS)

## 2019-12-03 NOTE — OR NURSING
1212     Arrived PACU  accompanied by anesthesia and OR RN. Airway patent, placed on cardiac monitor. Side rails up x 2. Arouses slightly, dozes off to sleep.    1225 Medicated for surgical site pain. Ice pk placed. Dressings clean and dry. Mayela pad dry and intact as well.    1246 Remedicated for pain.    1253 Taking po crackers and sprite.    1255 Report to Christian VALDERRAMA.    1330 Care reassumed.    1352 To bathroom, voids and dresses.    1410 Discharge instructions explained to patient and caregiver. Copy signed by adult caregiver, copy of instructions given to patient/family. IV discontinued, intact.     1414 To lobby with spouse, gait is steady, home with spouse as .

## 2019-12-03 NOTE — ANESTHESIA PROCEDURE NOTES
Airway  Date/Time: 12/3/2019 10:55 AM  Performed by: Keysha Alexis M.D.  Authorized by: Keysha Alexis M.D.     Location:  OR  Urgency:  Elective  Difficult Airway: No    Indications for Airway Management:  Anesthesia  Spontaneous Ventilation: absent    Sedation Level:  Deep  Preoxygenated: Yes    Patient Position:  Sniffing  Final Airway Type:  Endotracheal airway  Final Endotracheal Airway:  ETT  Cuffed: Yes    Technique Used for Successful ETT Placement:  Direct laryngoscopy  Insertion Site:  Oral  Blade Type:  Glide  Laryngoscope Blade/Videolaryngoscope Blade Size:  3  ETT Size (mm):  6.5  Measured from:  Teeth  ETT to Teeth (cm):  20  Placement Verified by: auscultation and capnometry    Cormack-Lehane Classification:  Grade I - full view of glottis  Number of Attempts at Approach:  1   Atraumatic, MS3 performed, successful first attempt, bite block placed, eyes taped, all PPP

## 2019-12-03 NOTE — OP REPORT
DATE OF SERVICE:  2019    INDICATION:  The patient is a 39-year-old  6, para 4-0-2-4, desires   sterilization, has an IUD in place and also has a left ovarian cyst on   ultrasound noted during evaluation for pelvic pain.    PREOPERATIVE DIAGNOSES:  Chronic pelvic pain, multipara, desires sterilization   and left ovarian cyst.    POSTOPERATIVE DIAGNOSES:  Chronic pelvic pain, multipara, desires   sterilization and left ovarian cyst with pelvic adhesions, abdominal adhesions   and endometriotic lesions in the pelvis of stage I-II.    PROCEDURE PERFORMED:  Laparoscopic bilateral salpingectomy with release of   adhesions, excision of adhesions, excision of endometriotic lesions, and IUD   removal.    ANESTHESIA:  General endotracheal tube.    ANESTHESIOLOGIST:  Keysha Alexis MD    SURGEON:  Shirlene Amanda MD    ASSISTANT:  Misty Smith MD    SPECIMEN:  Bilateral tubes with adhesions and endometriotic lesions.    FINDINGS:  During laparoscopy, uterus was noted to be normal size.  Both the   fallopian tubes showed signs of endometriosis.  The right fallopian tube had   flimsy adhesions covering from the tube to the ovaries with endometriotic   spots all over the adhesions and adherent ovary.  Left ovary had multiple   cysts, was enlarged and lobulated with multiple simple cysts and also   endometriotic vesicular lesions on the ovary, and left tube had multiple   endometriotic simple looking vesicular lesions all over, and the posterior   wall, distorted cul-de-sac was noted.    DESCRIPTION OF PROCEDURE:  The patient, after consenting, was taken to the   operating room where general anesthesia was induced without difficulty.  The   patient was then placed in the dorsal lithotomy position and examined under   anesthesia, prepped and draped in the normal sterile fashion.  Bladder was   straight catheterized.  A bivalve speculum was then placed in the patient's   vagina.  Anterior lip of the cervix was  grasped with single tooth tenaculum.    Hulka manipulator was introduced and held in place.  Rest of the instruments   were removed.  After re-gloving, attention was directed towards the patient's   abdomen.  Infraumbilical skin infiltrated with Marcaine with epinephrine.  A   10 mm skin incision was made in the umbilical fold.  A Veress needle was   carefully introduced into the peritoneal cavity at a 45-degree angle while   tenting up the abdominal wall.  Intraperitoneal placement was confirmed by the   low pressures peritoneal cavity.  Pneumoperitoneum was obtained with 4 liters   of CO2 gas and a 10 mm trocar and sleeve were then advanced without   difficulty into the abdomen where intra-abdominal placement was confirmed by   the laparoscope.  The abdomen was explored with the laparoscope findings as   noted above.  No injury or bleeding under the port site or entry track noted.    The patient was changed to Trendelenburg position.  Remaining ports were   placed under direct laparoscopic guidance, 5 mm suprapubic port.  Then,   attention was directed towards the bilateral salpingectomy.  The right tube,   which was identified, was pulled out and held with Prestige and the   mesosalpinx was clamped, coagulated, and cut and dissection was carried out   after releasing of the adhesions between the ovaries and the tubes and the   tube was clamped, coagulated, and cut parallel to the mesosalpinx up to 2 cm   from the uterine cornu.  Then, the whole segment of the tube was clamped,   coagulated, and transected and the tube was brought out through the umbilical   port and sent for pathology.  Remnants of fimbria attached to the ovary was   held again and was clamped, coagulated and cut and sent for pathology.    Similarly, the left tube was held with Prestige and the mesosalpinx was   clamped, coagulated, and cut and dissection was carried out clamping,   coagulation and cutting parallel to the tube and the mesosalpinx  as much of   the mesosalpinx was brought out since lot of multiple nodular vesicular   lesions was noted on the mesosalpinx and the tube was excised and brought out   through the umbilical port and sent for pathology.  The rest of the vesicular   lesions was held with LigaSure and were coagulated and burned.  Left ovary was   held.  Two small cysts were noted, which were lobulated on the top of the   cyst.  The left cyst was held with Prestige and the base of the cyst was   clamped and coagulated using the LigaSure and cut and the cyst wall was   completely excised and sent for pathology.  The second cyst was held with   Prestige and was ruptured and the cyst wall was excised using the LigaSure and   was sent for pathology.  Bleeders were coagulated.  Hemostasis was affirmed.    I then further exploration cul-de-sac was explored and a distorted was noted,   but no significant excisable lesions was noted.  Pelvic cavity was copiously   irrigated, fluid was suctioned out and after reaffirming hemostasis, all the   instruments were removed and pneumoperitoneum was released.  Umbilical port   fascia was closed with 0 Vicryl and the skin of both the ports were closed   with 4-0 Monocryl.  Hulka retractor was removed.  No bleeding was noted.  The   patient withstood the procedure well, was extubated and transferred to the   recovery room under stable condition.       ____________________________________     MD VAL Cuenca / MELISSA    DD:  12/03/2019 12:39:15  DT:  12/03/2019 13:31:26    D#:  [id number]  Job#:  [id number]

## 2019-12-03 NOTE — DISCHARGE INSTRUCTIONS
ACTIVITY: Rest and take it easy for the first 24 hours.  A responsible adult is recommended to remain with you during that time.  It is normal to feel sleepy.  We encourage you to not do anything that requires balance, judgment or coordination.    MILD FLU-LIKE SYMPTOMS ARE NORMAL. YOU MAY EXPERIENCE GENERALIZED MUSCLE ACHES, THROAT IRRITATION, HEADACHE AND/OR SOME NAUSEA.    FOR 24 HOURS DO NOT:  Drive, operate machinery or run household appliances.  Drink beer or alcoholic beverages.   Make important decisions or sign legal documents.    SPECIAL INSTRUCTIONS: Pelvic rest for six weeks.   Follow Dr Amanda's specific instructions    DIET: To avoid nausea, slowly advance diet as tolerated, avoiding spicy or greasy foods for the first day.  Add more substantial food to your diet according to your physician's instructions.  Babies can be fed formula or breast milk as soon as they are hungry.  INCREASE FLUIDS AND FIBER TO AVOID CONSTIPATION.    SURGICAL DRESSING/BATHING: *OK TO SHOWER.  NO TUB BATHS, HOT TUBS OR SWIMMING**    FOLLOW-UP APPOINTMENT:  A follow-up appointment should be arranged with your doctor in **two weeks*; call to schedule.    You should CALL YOUR PHYSICIAN if you develop:  Fever greater than 101 degrees F.  Pain not relieved by medication, or persistent nausea or vomiting.  Excessive bleeding (blood soaking through dressing) or unexpected drainage from the wound.  Extreme redness or swelling around the incision site, drainage of pus or foul smelling drainage.  Inability to urinate or empty your bladder within 8 hours.  Problems with breathing or chest pain.    You should call 911 if you develop problems with breathing or chest pain.  If you are unable to contact your doctor or surgical center, you should go to the nearest emergency room or urgent care center.  Physician's telephone #: *[phone]**    If any questions arise, call your doctor.  If your doctor is not available, please feel free  to call the Surgical Center at [phone].  The Center is open Monday through Friday from 7AM to 7PM.  You can also call the HEALTH HOTLINE open 24 hours/day, 7 days/week and speak to a nurse at [phone], or toll free at [phone].    A registered nurse may call you a few days after your surgery to see how you are doing after your procedure.    MEDICATIONS: Resume taking daily medication.  Take prescribed pain medication with food.  If no medication is prescribed, you may take non-aspirin pain medication if needed.  PAIN MEDICATION CAN BE VERY CONSTIPATING.  Take a stool softener or laxative such as senokot, pericolace, or milk of magnesia if needed.    Prescription given for *OXYCODONE AND IBUPROFEN*.  Last pain medication given at *1:15 P.M.**.    If your physician has prescribed pain medication that includes Acetaminophen (Tylenol), do not take additional Acetaminophen (Tylenol) while taking the prescribed medication.    Depression / Suicide Risk    As you are discharged from this [facility] facility, it is important to learn how to keep safe from harming yourself.    Recognize the warning signs:  · Abrupt changes in personality, positive or negative- including increase in energy   · Giving away possessions  · Change in eating patterns- significant weight changes-  positive or negative  · Change in sleeping patterns- unable to sleep or sleeping all the time   · Unwillingness or inability to communicate  · Depression  · Unusual sadness, discouragement and loneliness  · Talk of wanting to die  · Neglect of personal appearance   · Rebelliousness- reckless behavior  · Withdrawal from people/activities they love  · Confusion- inability to concentrate     If you or a loved one observes any of these behaviors or has concerns about self-harm, here's what you can do:  · Talk about it- your feelings and reasons for harming yourself  · Remove any means that you might use to hurt yourself (examples: pills,  rope, extension cords, firearm)  · Get professional help from the community (Mental Health, Substance Abuse, psychological counseling)  · Do not be alone:Call your Safe Contact- someone whom you trust who will be there for you.  · Call your local CRISIS HOTLINE 786-2552 or 443-399-0751  · Call your local Children's Mobile Crisis Response Team [place] (833) 480-0154 or www.[place]  · Call the toll free National Suicide Prevention Hotlines   · National Suicide Prevention Lifeline 198-064-THVT (5017)  · National Hope Line Network 800-SUICIDE (684-7866)

## 2019-12-03 NOTE — ANESTHESIA TIME REPORT
Anesthesia Start and Stop Event Times     Date Time Event    12/3/2019 1028 Ready for Procedure     1046 Anesthesia Start     1211 Anesthesia Stop        Responsible Staff  12/03/19    Name Role Begin End    Keysha Alexis M.D. Anesth 1046 1211        Preop Diagnosis (Free Text):  Pre-op Diagnosis     ENCOUNTER FOR STERILIZATION, LEFT OVARIAN CYST        Preop Diagnosis (Codes):    Post op Diagnosis  Sterilization  ovarian cyst    Premium Reason  Non-Premium    Comments:

## 2019-12-03 NOTE — ANESTHESIA PREPROCEDURE EVALUATION
39F hx migraines and GERD w/c. Current smoker. Endorses social ETOH.  Pt denies CP/SOB>4mets.  Denies CAD, CHF, CVA, CKD, DM2, bleeding/clotting d/o.  Denies FHX or PHx of anesthesia cxs.  Denies motion sickness.   Discussed risks/benefits GA and RA. Questions answered.      Relevant Problems   No relevant active problems       Physical Exam    Airway   Mallampati: II  TM distance: >3 FB  Neck ROM: full       Cardiovascular - normal exam  Rhythm: regular  Rate: normal  (-) murmur     Dental - normal exam         Pulmonary - normal exam  Breath sounds clear to auscultation     Abdominal    Neurological - normal exam                 Anesthesia Plan    ASA 2       Plan - general       Airway plan will be ETT        Induction: intravenous    Postoperative Plan: Postoperative administration of opioids is intended.    Pertinent diagnostic labs and testing reviewed    Informed Consent:    Anesthetic plan and risks discussed with patient.    Use of blood products discussed with: patient whom consented to blood products.

## 2019-12-03 NOTE — OR NURSING
REPORT FROM RACHAEL VALDERRAMA.  PATIENT RATES PAIN 7/10. GIVEN ORAL PAIN MEDICATION PER ORDER.   BROUGHT TO BEDSIDE.  6952 REPORT TO RACHAEL VALDERRAMA

## 2019-12-03 NOTE — ANESTHESIA QCDR
2019 [facility] Clinical Data Registry (for Quality Improvement)     Postoperative nausea/vomiting risk protocol (Adult = 18 yrs and Pediatric 3-17 yrs)- (430 and 463)  General inhalation anesthetic (NOT TIVA) with PONV risk factors: Yes  Provision of anti-emetic therapy with at least 2 different classes of agents: Yes   Patient DID NOT receive anti-emetic therapy and reason is documented in Medical Record:  N/A    Multimodal Pain Management- (AQI59)  Patient undergoing Elective Surgery (i.e. Outpatient, or ASC, or Prescheduled Surgery prior to Hospital Admission): Yes  Use of Multimodal Pain Management, two or more drugs and/or interventions, NOT including systemic opioids: Yes   Exception: Documented allergy to multiple classes of analgesics:  N/A    PACU assessment of acute postoperative pain prior to Anesthesia Care End- Applies to Patients Age = 18- (ABG7)  Initial PACU pain score is which of the following: < 7/10  Patient unable to report pain score: N/A    Post-anesthetic transfer of care checklist/protocol to PACU/ICU- (426 and 427)  Upon conclusion of case, patient transferred to which of the following locations: PACU/Non-ICU  Use of transfer checklist/protocol: Yes  Exclusion: Service Performed in Patient Hospital Room (and thus did not require transfer): N/A    PACU Reintubation- (AQI31)  General anesthesia requiring endotracheal intubation (ETT) along with subsequent extubation in OR or PACU: Yes  Required reintubation in the PACU: No   Extubation was a planned trial documented in the medical record prior to removal of the original airway device:  N/A    Unplanned admission to ICU related to anesthesia service up through end of PACU care- (MD51)  Unplanned admission to ICU (not initially anticipated at anesthesia start time): No

## 2021-06-02 ENCOUNTER — HOSPITAL ENCOUNTER (EMERGENCY)
Facility: MEDICAL CENTER | Age: 41
End: 2021-06-02
Attending: EMERGENCY MEDICINE | Admitting: EMERGENCY MEDICINE
Payer: MEDICAID

## 2021-06-02 VITALS
HEART RATE: 84 BPM | OXYGEN SATURATION: 97 % | SYSTOLIC BLOOD PRESSURE: 152 MMHG | RESPIRATION RATE: 18 BRPM | WEIGHT: 171.96 LBS | TEMPERATURE: 97.6 F | DIASTOLIC BLOOD PRESSURE: 82 MMHG | HEIGHT: 62 IN | BODY MASS INDEX: 31.64 KG/M2

## 2021-06-02 DIAGNOSIS — R22.0 FACIAL SWELLING: ICD-10-CM

## 2021-06-02 DIAGNOSIS — K04.7 DENTAL INFECTION: ICD-10-CM

## 2021-06-02 PROCEDURE — 700102 HCHG RX REV CODE 250 W/ 637 OVERRIDE(OP): Performed by: EMERGENCY MEDICINE

## 2021-06-02 PROCEDURE — A9270 NON-COVERED ITEM OR SERVICE: HCPCS | Performed by: EMERGENCY MEDICINE

## 2021-06-02 PROCEDURE — 99283 EMERGENCY DEPT VISIT LOW MDM: CPT

## 2021-06-02 RX ORDER — HYDROCODONE BITARTRATE AND ACETAMINOPHEN 5; 325 MG/1; MG/1
1 TABLET ORAL EVERY 6 HOURS PRN
Qty: 12 TABLET | Refills: 0 | Status: SHIPPED | OUTPATIENT
Start: 2021-06-02 | End: 2021-06-07

## 2021-06-02 RX ORDER — PENICILLIN V POTASSIUM 500 MG/1
500 TABLET ORAL
Qty: 40 TABLET | Refills: 0 | Status: SHIPPED | OUTPATIENT
Start: 2021-06-02 | End: 2021-06-12

## 2021-06-02 RX ORDER — PENICILLIN V POTASSIUM 500 MG/1
500 TABLET ORAL ONCE
Status: COMPLETED | OUTPATIENT
Start: 2021-06-02 | End: 2021-06-02

## 2021-06-02 RX ORDER — OMEPRAZOLE 20 MG/1
20 CAPSULE, DELAYED RELEASE ORAL DAILY
COMMUNITY

## 2021-06-02 RX ORDER — HYDROCODONE BITARTRATE AND ACETAMINOPHEN 5; 325 MG/1; MG/1
1 TABLET ORAL ONCE
Status: COMPLETED | OUTPATIENT
Start: 2021-06-02 | End: 2021-06-02

## 2021-06-02 RX ORDER — PENICILLIN V POTASSIUM 500 MG/1
500 TABLET ORAL
Qty: 40 TABLET | Refills: 0 | Status: SHIPPED | OUTPATIENT
Start: 2021-06-02 | End: 2021-06-02 | Stop reason: SDUPTHER

## 2021-06-02 RX ADMIN — HYDROCODONE BITARTRATE AND ACETAMINOPHEN 1 TABLET: 5; 325 TABLET ORAL at 09:23

## 2021-06-02 RX ADMIN — PENICILLIN V POTASSIUM 500 MG: 500 TABLET, FILM COATED ORAL at 09:53

## 2021-06-02 ASSESSMENT — PAIN DESCRIPTION - PAIN TYPE: TYPE: ACUTE PAIN

## 2021-06-02 NOTE — ED NOTES
Educated on triage process. Instructed to notify staff for any worsening symptoms. Denies any recent travel. Denies exposure to known covid positive patients. Denies any respiratory symptoms.

## 2021-06-02 NOTE — ED TRIAGE NOTES
Adry Sanchez  40 y.o.  female  Chief Complaint   Patient presents with   • Facial Swelling     Started today, Lt side of face, tender to touch.   • Dental Pain     X1 month, upper Lt gums/tooth, 5/10.        Pt denies chills/fevers at home. No difficulty swallowing, some difficulty chewing food due to pain.

## 2021-06-02 NOTE — ED NOTES
Received orders for DC. Medicated per MAR. Educated regarding prescribed medications. VSS. Aware to follow up with dentist. Ambulatory to lobby with steady gait.

## 2021-06-02 NOTE — ED PROVIDER NOTES
ED Provider Note    Scribed for Gianni Barbosa M.D. by Geoffrey Carr. 6/2/2021, 8:58 AM.    Primary care provider: None noted  Means of arrival: Walk in  History obtained from: Patient  History limited by: None    CHIEF COMPLAINT  Chief Complaint   Patient presents with    Facial Swelling     Started today, Lt side of face, tender to touch.    Dental Pain     X1 month, upper Lt gums/tooth, 5/10.      HPI  Adry Sanchez is a 40 y.o. female who presents to the Emergency Department for evaluation of moderate left upper dental pain onset approximately one month. She admits to associated symptoms of moderate left sided facial swelling (since this morning), but denies fever or chills. No alleviating factors were reported. She denies history of allergies to antibiotics.     REVIEW OF SYSTEMS  Pertinent positives include left upper dental pain, left sided facial swelling.   Pertinent negatives include no fever or chills.      PAST MEDICAL HISTORY   has a past medical history of Arthritis, Depression, Fracture, Head ache, Heart murmur, Hypertension, Migraines, and Pain.    SURGICAL HISTORY   has a past surgical history that includes appendectomy; lap,diagnostic abdomen (12/3/2019); salpingectomy (Bilateral, 12/3/2019); and oophorectomy (Left, 12/3/2019).    SOCIAL HISTORY  Social History     Tobacco Use    Smoking status: Current Some Day Smoker     Packs/day: 0.25     Years: 20.00     Pack years: 5.00     Types: Cigarettes    Smokeless tobacco: Never Used   Vaping Use    Vaping Use: Never used   Substance Use Topics    Alcohol use: Yes     Comment: 2 per week    Drug use: No      Social History     Substance and Sexual Activity   Drug Use No       FAMILY HISTORY  History reviewed. No pertinent family history.    CURRENT MEDICATIONS  Home Medications       Reviewed by Barbara Zepeda R.N. (Registered Nurse) on 06/02/21 at 0747  Med List Status: Not Addressed     Medication Last Dose Status   ibuprofen (MOTRIN)  "800 MG Tab 6/1/2021 Active   omeprazole (PRILOSEC) 20 MG delayed-release capsule 6/1/2021 Active   propranolol (INDERAL) 20 MG Tab 6/1/2021 Active   raNITIdine HCl (RA RANITIDINE PO) not taking Active                  ALLERGIES  No Known Allergies    PHYSICAL EXAM  VITAL SIGNS: BP (!) 167/106   Pulse 95   Temp 36 °C (96.8 °F) (Temporal)   Resp 16   Ht 1.575 m (5' 2\")   Wt 78 kg (171 lb 15.3 oz)   SpO2 97%   BMI 31.45 kg/m²   Constitutional: Awake, alert, in no acute distress, Non-toxic appearance.   HENT: Mucus membranes moist.  Oropharynx is clear.  There are scattered caries. There is swelling of the left cheek, extending to the left lower eyelid. There is tenderness over left cheek.  Tongue is normal.  Floor of the mouth is normal.  Submental space is soft.  Posterior pharynx is normal.  Patient is tolerating secretions without difficulty. There is no evidence of Hector's Angina.  Eyes: PERRL, EOMI, conjunctiva moist, noninjected.  Neck: Nontender, Normal range of motion, No nuchal rigidity, No stridor.   Lymphatic: No lymphadenopathy noted.   Cardiovascular: Regular rate and rhythm, no murmurs, rubs, gallops.  Thorax & Lungs:  Good breath sounds bilaterally, no wheezes, rales, or retractions.  No chest tenderness.   Abdomen: Soft, nontender, nondistended  Extremities: No edema, No tenderness.   Skin: Warm, Dry, No rashes.   Neurologic: Alert, sensory and motor function normal. No focal deficits.   Psychiatric: Affect normal, Judgment normal, Mood normal. Appropriate for clinical situation    COURSE & MEDICAL DECISION MAKING  Nursing notes, VS, PMSFHx reviewed in chart    8:58 AM - Patient seen and examined at bedside. Patient will be treated with Veetid 500 mg tab and Norco 5-325 mg tab prior to discharge. I discussed plan for discharge and follow up with Oral Surgery as outlined below. The patient verbalizes they feel comfortable going home. The patient is stable for discharge at this time and will " return for any new or worsening symptoms. Patient verbalizes understanding and support with my plan for discharge.      I reviewed prescription monitoring program for patient's narcotic use before prescribing a scheduled drug.The patient will not drink alcohol nor drive with prescribed medications. The patient will return for new or worsening symptoms and is stable at the time of discharge.    In prescribing controlled substances to this patient, I certify that I have obtained and reviewed the medical history of Adry Sanchez. I have also made a good carter effort to obtain applicable records from other providers who have treated the patient and records did not demonstrate any increased risk of substance abuse that would prevent me from prescribing controlled substances.     I have conducted a physical exam and documented it. I have reviewed Ms. Sanchez’s prescription history as maintained by the [place] Prescription Monitoring Program.     I have assessed the patient’s risk for abuse, dependency, and addiction using the validated Opioid Risk Tool available at https://www.mdcalc.com/nfjjei-cbrx-qjex-ort-narcotic-abuse.     Given the above, I believe the benefits of controlled substance therapy outweigh the risks. The reasons for prescribing controlled substances include non-narcotic, oral analgesic alternatives have been inadequate for pain control. Accordingly, I have discussed the risk and benefits, treatment plan, and alternative therapies with the patient.       DISPOSITION:  Patient will be discharged home in stable condition.    FOLLOW UP:  [facility], Emergency Dept  [address] [address] [phone]    If symptoms worsen    Adolfo Rubio D.D.S.  [address]  [facility] [address]  [phone]    Schedule an appointment as soon as possible for a visit       OUTPATIENT MEDICATIONS:  New Prescriptions    HYDROCODONE-ACETAMINOPHEN (NORCO) 5-325 MG TAB PER TABLET     Take 1 tablet by mouth every 6 hours as needed for up to 5 days.    PENICILLIN V POTASSIUM (VEETID) 500 MG TAB    Take 1 tablet by mouth 4 Times a Day,Before Meals and at Bedtime for 10 days.     The patient was discharged home with an information sheet on toothache and dental abscess and told to return immediately for any signs or symptoms listed.  The patient agreed to the discharge precautions and follow-up plan which is documented in EPIC.    FINAL IMPRESSION  1. Dental infection    2. Facial swelling        Geoffrey WELSH (Katelyn), am scribing for, and in the presence of, Gianni Barbosa M.D..    Electronically signed by: Geoffrey Carr (Katelyn), 6/2/2021    Gianni WELSH M.D. personally performed the services described in this documentation, as scribed by Geoffrey Carr in my presence, and it is both accurate and complete.    The note accurately reflects work and decisions made by me.  Gianni Barbosa M.D.  6/2/2021  2:15 PM

## 2022-09-29 ENCOUNTER — HOSPITAL ENCOUNTER (OUTPATIENT)
Dept: RADIOLOGY | Facility: MEDICAL CENTER | Age: 42
End: 2022-09-29
Attending: FAMILY MEDICINE
Payer: MEDICAID

## 2022-09-29 ENCOUNTER — HOSPITAL ENCOUNTER (OUTPATIENT)
Dept: RADIOLOGY | Facility: MEDICAL CENTER | Age: 42
End: 2022-09-29

## 2022-09-29 ENCOUNTER — HOSPITAL ENCOUNTER (OUTPATIENT)
Dept: RADIOLOGY | Facility: MEDICAL CENTER | Age: 42
End: 2022-09-29
Attending: NURSE PRACTITIONER
Payer: MEDICAID

## 2022-09-29 DIAGNOSIS — N60.29 FIBROADENOSIS OF BREAST, UNSPECIFIED LATERALITY: ICD-10-CM

## 2022-09-29 PROCEDURE — G0279 TOMOSYNTHESIS, MAMMO: HCPCS

## 2022-09-29 PROCEDURE — 76642 ULTRASOUND BREAST LIMITED: CPT | Mod: RT

## (undated) DEVICE — GLOVE BIOGEL PI INDICATOR SZ 6.5 SURGICAL PF LF - (50/BX 4BX/CA)

## (undated) DEVICE — ARMREST CRADLE FOAM - (2PR/PK 12PR/CA)

## (undated) DEVICE — SUTURE 4-0 MONOCRYL PLUS PS-2 - 27 INCH (36/BX)

## (undated) DEVICE — SPONGE GAUZESTER. 2X2 4-PL - (2/PK 50PK/BX 30BX/CS)

## (undated) DEVICE — GOWN WARMING STANDARD FLEX - (30/CA)

## (undated) DEVICE — SPONGE GAUZE STER 4X4 8-PL - (2/PK 50PK/BX 12BX/CS)

## (undated) DEVICE — PROTECTOR ULNA NERVE - (36PR/CA)

## (undated) DEVICE — GLOVE SZ 6.5 BIOGEL PI MICRO - PF LF (50PR/BX)

## (undated) DEVICE — DRESSING TRANSPARENT FILM TEGADERM 2.375 X 2.75"  (100EA/BX)"

## (undated) DEVICE — ELECTRODE DUAL RETURN W/ CORD - (50/PK)

## (undated) DEVICE — GVL 3 STAT DISPOSABLE - (10/BX)

## (undated) DEVICE — SUTURE 0 VICRYL PLUS UR-6 - 27 INCH (36/BX)

## (undated) DEVICE — DRAPE STRLE REG TOWEL 18X24 - (10/BX 4BX/CA)"

## (undated) DEVICE — TROCAR Z THREAD 11 X 100 - BLADED (6/BX)

## (undated) DEVICE — GLOVE SZ 7 BIOGEL PI MICRO - PF LF (50PR/BX 4BX/CA)

## (undated) DEVICE — DRESSING NON-ADHERING 8 X 3 - (50/BX)

## (undated) DEVICE — KIT ANESTHESIA W/CIRCUIT & 3/LT BAG W/FILTER (20EA/CA)

## (undated) DEVICE — DRAPESURG STERI-DRAPE LONG - (10/BX 4BX/CA)

## (undated) DEVICE — SET EXTENSION WITH 2 PORTS (48EA/CA) ***PART #2C8610 IS A SUBSTITUTE*****

## (undated) DEVICE — GLOVE BIOGEL SZ 7 SURGICAL PF LTX - (50PR/BX 4BX/CA)

## (undated) DEVICE — ELECTRODE 850 FOAM ADHESIVE - HYDROGEL RADIOTRNSPRNT (50/PK)

## (undated) DEVICE — MASK ANESTHESIA ADULT  - (100/CA)

## (undated) DEVICE — PACK SINGLE BASIN - (6/CA)

## (undated) DEVICE — TUBING CLEARLINK DUO-VENT - C-FLO (48EA/CA)

## (undated) DEVICE — GLOVE BIOGEL SZ 6.5 SURGICAL PF LTX (50PR/BX 4BX/CA)

## (undated) DEVICE — TUBE CONNECTING SUCTION - CLEAR PLASTIC STERILE 72 IN (50EA/CA)

## (undated) DEVICE — MANIFOLD NEPTUNE 1 PORT (20/PK)

## (undated) DEVICE — LACTATED RINGERS INJ 1000 ML - (14EA/CA 60CA/PF)

## (undated) DEVICE — SET SUCTION/IRRIGATION WITH DISPOSABLE TIP (6/CA )PART #0250-070-520 IS A SUB

## (undated) DEVICE — SENSOR SPO2 NEO LNCS ADHESIVE (20/BX) SEE USER NOTES

## (undated) DEVICE — TRAY CATHETER FOLEY URINE METER W/STATLOCK 350ML (10EA/CA)

## (undated) DEVICE — PAD LAP STERILE 18 X 18 - (5/PK 40PK/CA)

## (undated) DEVICE — TRAY SRGPRP PVP IOD WT PRP - (20/CA)

## (undated) DEVICE — CANISTER SUCTION RIGID RED 1500CC (40EA/CA)

## (undated) DEVICE — SPONGE GAUZESTER 4 X 4 4PLY - (128PK/CA)

## (undated) DEVICE — GLOVE SZ 7.5 BIOGEL PI MICRO - PF LF (50PR/BX)

## (undated) DEVICE — KIT  I.V. START (100EA/CA)

## (undated) DEVICE — CATHETER IV 20 GA X 1-1/4 ---SURG.& SDS ONLY--- (50EA/BX)

## (undated) DEVICE — TROCAR 5X100 BLADED ADVANCE - FIXATION (6/BX)

## (undated) DEVICE — SET LEADWIRE 5 LEAD BEDSIDE DISPOSABLE ECG (1SET OF 5/EA)

## (undated) DEVICE — SUTURE GENERAL

## (undated) DEVICE — PAD SANITARY 11IN MAXI IND WRAPPED  (12EA/PK 24PK/CA)

## (undated) DEVICE — NEEDLE INSFL 120MM 14GA VRRS - (20/BX)

## (undated) DEVICE — CANISTER SUCTION 3000ML MECHANICAL FILTER AUTO SHUTOFF MEDI-VAC NONSTERILE LF DISP  (40EA/CA)

## (undated) DEVICE — GLOVE BIOGEL PI INDICATOR SZ 8.0 SURGICAL PF LF -(50/BX 4BX/CA)

## (undated) DEVICE — CANNULA W/SEAL 5X100 Z-THRE - ADED KII (12/BX)

## (undated) DEVICE — WATER IRRIGATION STERILE 1000ML (12EA/CA)

## (undated) DEVICE — PACK LAPAROSCOPY - (1/CA)

## (undated) DEVICE — HEAD HOLDER JUNIOR/ADULT

## (undated) DEVICE — TUBE E-T HI-LO CUFF 6.5MM (10EA/BX)

## (undated) DEVICE — ADHESIVE DERMABOND HVD MINI (12EA/BX)

## (undated) DEVICE — GOWN SURGEONS X-LARGE - DISP. (30/CA)

## (undated) DEVICE — SODIUM CHL IRRIGATION 0.9% 1000ML (12EA/CA)

## (undated) DEVICE — CHLORAPREP 26 ML APPLICATOR - ORANGE TINT(25/CA)

## (undated) DEVICE — SUCTION INSTRUMENT YANKAUER BULBOUS TIP W/O VENT (50EA/CA)